# Patient Record
Sex: MALE | Race: WHITE | Employment: FULL TIME | URBAN - METROPOLITAN AREA
[De-identification: names, ages, dates, MRNs, and addresses within clinical notes are randomized per-mention and may not be internally consistent; named-entity substitution may affect disease eponyms.]

---

## 2017-03-02 ENCOUNTER — APPOINTMENT (OUTPATIENT)
Dept: OCCUPATIONAL MEDICINE | Age: 27
End: 2017-03-02
Attending: EMERGENCY MEDICINE

## 2017-05-25 ENCOUNTER — HOSPITAL ENCOUNTER (OUTPATIENT)
Age: 27
Discharge: HOME | End: 2017-05-25
Attending: FAMILY MEDICINE
Payer: OTHER MISCELLANEOUS

## 2017-05-25 ENCOUNTER — APPOINTMENT (OUTPATIENT)
Dept: GENERAL RADIOLOGY | Age: 27
End: 2017-05-25
Attending: FAMILY MEDICINE
Payer: OTHER MISCELLANEOUS

## 2017-05-25 VITALS
HEART RATE: 79 BPM | SYSTOLIC BLOOD PRESSURE: 106 MMHG | HEIGHT: 71 IN | OXYGEN SATURATION: 97 % | TEMPERATURE: 98 F | WEIGHT: 250 LBS | RESPIRATION RATE: 16 BRPM | BODY MASS INDEX: 35 KG/M2 | DIASTOLIC BLOOD PRESSURE: 77 MMHG

## 2017-05-25 DIAGNOSIS — S43.401A SPRAIN OF RIGHT SHOULDER, UNSPECIFIED SHOULDER SPRAIN TYPE, INITIAL ENCOUNTER: Primary | ICD-10-CM

## 2017-05-25 PROCEDURE — 73030 X-RAY EXAM OF SHOULDER: CPT | Performed by: FAMILY MEDICINE

## 2017-05-25 PROCEDURE — 99203 OFFICE O/P NEW LOW 30 MIN: CPT

## 2017-05-25 PROCEDURE — 99204 OFFICE O/P NEW MOD 45 MIN: CPT

## 2017-05-25 RX ORDER — CYCLOBENZAPRINE HCL 10 MG
10 TABLET ORAL 3 TIMES DAILY PRN
Qty: 21 TABLET | Refills: 0 | Status: SHIPPED | OUTPATIENT
Start: 2017-05-25

## 2017-05-25 RX ORDER — NAPROXEN SODIUM 550 MG/1
550 TABLET ORAL 2 TIMES DAILY WITH MEALS
Qty: 20 TABLET | Refills: 0 | Status: SHIPPED | OUTPATIENT
Start: 2017-05-25 | End: 2017-06-04

## 2017-05-25 NOTE — ED PROVIDER NOTES
Patient Seen in: [address] [facility] [place]    History   Patient presents with:  Shoulder Injury    Stated Complaint: shoulder pain/wc    HPI    71-year-old male who presents to clinic today as Workmen's Comp with chief complaints of right shoulder other systems reviewed and negative except as noted above. PSFH elements reviewed from today and agreed except as otherwise stated in HPI.     Physical Exam     ED Triage Vitals   BP 05/25/17 1546 106/77 mmHg   Pulse 05/25/17 1546 79   Resp 05/25/17 1546 Yes      ED Course   Labs Reviewed - No data to display  Xr Shoulder, Complete (min 2 Views), Right (cpt=73030)    5/25/2017  PROCEDURE:  XR SHOULDER, COMPLETE (MIN 2 VIEWS), RIGHT (CPT=73030)     TECHNIQUE:  Multiple views were obtained.   COMPARISON:  Non (three) times daily as needed for Muscle spasms. , Normal, Disp-21 tablet, R-0

## 2017-05-25 NOTE — ED INITIAL ASSESSMENT (HPI)
Pt presents to [facility] with right shoulder injury. Pt states he was carrying his tool belt and felt his shoulder \"pop\" at 1:30pm today. Pt states his right fingers are tingling and hand is numb. Pt has full ROM with pain. Pt rates pain 7/10 at this time. Pt ha

## 2017-05-30 ENCOUNTER — OFFICE VISIT (OUTPATIENT)
Dept: OTHER | Age: 27
End: 2017-05-30
Attending: PREVENTIVE MEDICINE
Payer: OTHER MISCELLANEOUS

## 2017-05-30 DIAGNOSIS — M25.511 ACUTE PAIN OF RIGHT SHOULDER: Primary | ICD-10-CM

## 2017-05-30 DIAGNOSIS — S46.911A RIGHT SHOULDER STRAIN, INITIAL ENCOUNTER: ICD-10-CM

## 2017-05-30 RX ORDER — NAPROXEN 500 MG/1
TABLET ORAL
Qty: 30 TABLET | Refills: 0 | Status: SHIPPED | OUTPATIENT
Start: 2017-05-30

## 2017-06-08 PROBLEM — S43.421A SPRAIN OF RIGHT ROTATOR CUFF CAPSULE, INITIAL ENCOUNTER: Status: ACTIVE | Noted: 2017-06-08

## 2017-07-06 PROBLEM — S43.421D SPRAIN OF RIGHT ROTATOR CUFF CAPSULE, SUBSEQUENT ENCOUNTER: Status: ACTIVE | Noted: 2017-06-08

## 2017-09-25 PROBLEM — M75.41 IMPINGEMENT SYNDROME OF RIGHT SHOULDER: Status: ACTIVE | Noted: 2017-09-25

## 2017-09-25 PROBLEM — G25.89 SCAPULAR DYSKINESIS: Status: ACTIVE | Noted: 2017-09-25

## (undated) NOTE — ED AVS SNAPSHOT
THE [facility] in [facility] [address] [address]    Phone:  [phone]    Fax:  [address] [place] [address],[address]   MRN: [id number]    Department:  THE [facility] in [place]   Date of Visit:  5/25/2017 Bart, [phone], [phone]  [address], [facility] [address]     Phone:  [phone]    - Cyclobenzaprine HCl 10 MG Tabs  - Naproxen Sodium 550 MG Tabs            Discharge References/Attachments     SHOULDER SPRA care or specialist physician will see patients referred from the [facility]. Follow-up care is at the discretion of that Physician.     IF THERE IS ANY CHANGE OR WORSENING OF YOUR CONDITION, CALL YOUR PRIMARY CARE PHYSICIAN AT ONCE OR GO TO THE harming yourself, contact [facility] at [phone]. - If you don’t have insurance, Isidra Pineda has partnered with [facility] [address] to help you get signed up for insurance coverage.   Patient [place] your Zip Code and Date of Birth to complete the sign-up process. If you do not sign up before the expiration date, you must request a new code.     Your unique [employer] Access Code: [email or web address]  Expires: 7/24/2017  4:31 PM    If you have questions, you can c

## (undated) NOTE — LETTER
[facility]MM[facility] IN [place]  [address]  [place] [address]  Dept: [phone]  Dept Fax: [phone]       WORK STATUS WORKSHEET    NAME: Adrienletty Dacia  DIAGNOSIS:    1.  Sprain of right shoulder, unspecified shoulder sprain type, initi